# Patient Record
Sex: MALE | Race: WHITE | URBAN - METROPOLITAN AREA
[De-identification: names, ages, dates, MRNs, and addresses within clinical notes are randomized per-mention and may not be internally consistent; named-entity substitution may affect disease eponyms.]

---

## 2021-05-12 ENCOUNTER — OFFICE VISIT (OUTPATIENT)
Dept: URBAN - METROPOLITAN AREA CLINIC 74 | Facility: CLINIC | Age: 46
End: 2021-05-12
Payer: COMMERCIAL

## 2021-05-12 ENCOUNTER — WEB ENCOUNTER (OUTPATIENT)
Dept: URBAN - METROPOLITAN AREA CLINIC 74 | Facility: CLINIC | Age: 46
End: 2021-05-12

## 2021-05-12 DIAGNOSIS — Z12.11 COLON CANCER SCREENING: ICD-10-CM

## 2021-05-12 DIAGNOSIS — R94.5 ABNORMAL LFTS: ICD-10-CM

## 2021-05-12 DIAGNOSIS — K21.9 GASTROESOPHAGEAL REFLUX DISEASE, UNSPECIFIED WHETHER ESOPHAGITIS PRESENT: ICD-10-CM

## 2021-05-12 DIAGNOSIS — R14.0 BLOATING: ICD-10-CM

## 2021-05-12 PROBLEM — 116289008: Status: ACTIVE | Noted: 2021-05-12

## 2021-05-12 PROBLEM — 166603001: Status: ACTIVE | Noted: 2021-05-12

## 2021-05-12 PROCEDURE — 99204 OFFICE O/P NEW MOD 45 MIN: CPT | Performed by: STUDENT IN AN ORGANIZED HEALTH CARE EDUCATION/TRAINING PROGRAM

## 2021-05-12 RX ORDER — SODIUM, POTASSIUM,MAG SULFATES 17.5-3.13G
354ML SOLUTION, RECONSTITUTED, ORAL ORAL
Qty: 354 MILLILITER | Refills: 0 | OUTPATIENT
Start: 2021-05-12 | End: 2021-05-13

## 2021-05-12 RX ORDER — LISINOPRIL 40 MG/1
1 TABLET TABLET ORAL ONCE A DAY
Status: ACTIVE | COMMUNITY

## 2021-05-12 RX ORDER — METOPROLOL SUCCINATE 25 MG/1
1 TABLET TABLET, FILM COATED, EXTENDED RELEASE ORAL ONCE A DAY
Status: ACTIVE | COMMUNITY

## 2021-05-12 RX ORDER — OMEPRAZOLE 20 MG/1
1 CAPSULE 30 MINUTES BEFORE MORNING MEAL CAPSULE, DELAYED RELEASE ORAL ONCE A DAY
Qty: 30 | Refills: 2 | OUTPATIENT
Start: 2021-05-12

## 2021-05-12 RX ORDER — AMLODIPINE BESYLATE 10 MG/1
1 TABLET TABLET ORAL ONCE A DAY
Status: ACTIVE | COMMUNITY

## 2021-05-12 NOTE — HPI-TODAY'S VISIT:
45-year-old male New to me Comes in for evaluation of multiple GI complaints Patient states that he has been having heartburn for many months and it used to be controlled well with PPI but not anymore.  Patient denies any difficulty in swallowing or odynophagia.  No nausea or vomiting.  Does report significant bloating which is bothersome for him.  Does not changes with the type of meal.  Abdomen discomfort which is moderate in intensity and mostly in the upper abdomen with no radiation to back or shoulder.  No specific worsening in postprandial state. No change in bowel movements.  No constipation or diarrhea.  No blood in the stool. No NSAID use. No marijuana use. No significant history of colon cancer in first-degree relatives. No prior colonoscopy or EGD. No anticoagulation.

## 2021-06-10 PROBLEM — 305058001: Status: ACTIVE | Noted: 2021-05-12

## 2021-06-18 ENCOUNTER — TELEPHONE ENCOUNTER (OUTPATIENT)
Dept: URBAN - METROPOLITAN AREA CLINIC 74 | Facility: CLINIC | Age: 46
End: 2021-06-18

## 2021-06-21 ENCOUNTER — OFFICE VISIT (OUTPATIENT)
Dept: URBAN - METROPOLITAN AREA CLINIC 73 | Facility: CLINIC | Age: 46
End: 2021-06-21
Payer: COMMERCIAL

## 2021-06-21 DIAGNOSIS — K80.20 GALLSTONES: ICD-10-CM

## 2021-06-21 DIAGNOSIS — N28.1 RIGHT RENAL CYST: ICD-10-CM

## 2021-06-21 DIAGNOSIS — K76.0 FATTY (CHANGE OF) LIVER, NOT ELSEWHERE CLASSIFIED: ICD-10-CM

## 2021-06-21 PROCEDURE — 76705 ECHO EXAM OF ABDOMEN: CPT | Performed by: STUDENT IN AN ORGANIZED HEALTH CARE EDUCATION/TRAINING PROGRAM

## 2021-06-23 ENCOUNTER — TELEPHONE ENCOUNTER (OUTPATIENT)
Dept: URBAN - METROPOLITAN AREA CLINIC 74 | Facility: CLINIC | Age: 46
End: 2021-06-23

## 2021-06-24 ENCOUNTER — OFFICE VISIT (OUTPATIENT)
Dept: URBAN - METROPOLITAN AREA SURGERY CENTER 30 | Facility: SURGERY CENTER | Age: 46
End: 2021-06-24
Payer: COMMERCIAL

## 2021-06-24 DIAGNOSIS — K22.8 COLUMNAR-LINED ESOPHAGUS: ICD-10-CM

## 2021-06-24 DIAGNOSIS — B96.81 BACTERIAL INFECTION DUE TO H. PYLORI: ICD-10-CM

## 2021-06-24 DIAGNOSIS — Z12.11 COLON CANCER SCREENING: ICD-10-CM

## 2021-06-24 DIAGNOSIS — K29.60 ADENOPAPILLOMATOSIS GASTRICA: ICD-10-CM

## 2021-06-24 DIAGNOSIS — D12.2 ADENOMA OF ASCENDING COLON: ICD-10-CM

## 2021-06-24 DIAGNOSIS — D12.4 ADENOMA OF DESCENDING COLON: ICD-10-CM

## 2021-06-24 PROCEDURE — G8907 PT DOC NO EVENTS ON DISCHARG: HCPCS | Performed by: STUDENT IN AN ORGANIZED HEALTH CARE EDUCATION/TRAINING PROGRAM

## 2021-06-24 PROCEDURE — 43239 EGD BIOPSY SINGLE/MULTIPLE: CPT | Performed by: STUDENT IN AN ORGANIZED HEALTH CARE EDUCATION/TRAINING PROGRAM

## 2021-06-24 PROCEDURE — 45385 COLONOSCOPY W/LESION REMOVAL: CPT | Performed by: STUDENT IN AN ORGANIZED HEALTH CARE EDUCATION/TRAINING PROGRAM

## 2021-07-07 ENCOUNTER — OFFICE VISIT (OUTPATIENT)
Dept: URBAN - METROPOLITAN AREA CLINIC 74 | Facility: CLINIC | Age: 46
End: 2021-07-07
Payer: COMMERCIAL

## 2021-07-07 DIAGNOSIS — D12.6 SERRATED ADENOMA OF COLON: ICD-10-CM

## 2021-07-07 DIAGNOSIS — A04.8 HELICOBACTER PYLORI (H. PYLORI) INFECTION: ICD-10-CM

## 2021-07-07 PROCEDURE — 99213 OFFICE O/P EST LOW 20 MIN: CPT | Performed by: STUDENT IN AN ORGANIZED HEALTH CARE EDUCATION/TRAINING PROGRAM

## 2021-07-07 RX ORDER — AMOXICILLIN 500 MG/1
2 CAPSULES CAPSULE ORAL TWICE A DAY
Qty: 40 CAPSULE | Refills: 0 | OUTPATIENT

## 2021-07-07 RX ORDER — LISINOPRIL 40 MG/1
1 TABLET TABLET ORAL ONCE A DAY
Status: ACTIVE | COMMUNITY

## 2021-07-07 RX ORDER — PANTOPRAZOLE SODIUM 40 MG/1
1 TABLET TABLET, DELAYED RELEASE ORAL BID
Qty: 20 TABLET | Refills: 0 | OUTPATIENT

## 2021-07-07 RX ORDER — AMLODIPINE BESYLATE 10 MG/1
1 TABLET TABLET ORAL ONCE A DAY
Status: ACTIVE | COMMUNITY

## 2021-07-07 RX ORDER — METOPROLOL SUCCINATE 25 MG/1
1 TABLET TABLET, FILM COATED, EXTENDED RELEASE ORAL ONCE A DAY
Status: ACTIVE | COMMUNITY

## 2021-07-07 RX ORDER — OMEPRAZOLE 20 MG/1
1 CAPSULE 30 MINUTES BEFORE MORNING MEAL CAPSULE, DELAYED RELEASE ORAL ONCE A DAY
Qty: 30 | Refills: 2 | Status: ACTIVE | COMMUNITY
Start: 2021-05-12

## 2021-07-07 RX ORDER — METRONIDAZOLE 500 MG/1
1 TABLET TABLET ORAL TWICE A DAY
Qty: 20 TABLET | Refills: 0 | OUTPATIENT

## 2021-07-07 RX ORDER — CLARITHROMYCIN 500 MG/1
1 TABLET TABLET, FILM COATED ORAL BID
Qty: 20 TABLET | Refills: 0 | OUTPATIENT

## 2021-07-07 NOTE — HPI-TODAY'S VISIT:
45-year-old male Well-established with me comes in for follow-up visit EGD was notable for gastritis with biopsies confirming Helicobacter pylori infection.  Small bowel biopsies within normal limit. Colonoscopy was notable for 2 small polyps, both were removed, both were sessile serrated adenoma on final pathology. Patient currently on PPI.  States bloating is better.  Still intermittent bloating reported.  Otherwise no nausea vomiting.  No significant abdomen pain.  Regular bowel movements.  No reflux or dysphagia.

## 2021-08-13 ENCOUNTER — OFFICE VISIT (OUTPATIENT)
Dept: URBAN - METROPOLITAN AREA CLINIC 73 | Facility: CLINIC | Age: 46
End: 2021-08-13
Payer: COMMERCIAL

## 2021-08-13 DIAGNOSIS — D12.5 ADENOMA OF SIGMOID COLON: ICD-10-CM

## 2021-08-13 DIAGNOSIS — K29.60 ADENOPAPILLOMATOSIS GASTRICA: ICD-10-CM

## 2021-08-13 DIAGNOSIS — A04.8 BACTERIAL INFECTION DUE TO H. PYLORI: ICD-10-CM

## 2021-08-13 PROCEDURE — 83014 H PYLORI DRUG ADMIN: CPT | Performed by: INTERNAL MEDICINE

## 2021-08-13 RX ORDER — METOPROLOL SUCCINATE 25 MG/1
1 TABLET TABLET, FILM COATED, EXTENDED RELEASE ORAL ONCE A DAY
Status: ACTIVE | COMMUNITY

## 2021-08-13 RX ORDER — AMLODIPINE BESYLATE 10 MG/1
1 TABLET TABLET ORAL ONCE A DAY
Status: ACTIVE | COMMUNITY

## 2021-08-13 RX ORDER — PANTOPRAZOLE SODIUM 40 MG/1
1 TABLET TABLET, DELAYED RELEASE ORAL BID
Qty: 20 TABLET | Refills: 0 | Status: ACTIVE | COMMUNITY

## 2021-08-13 RX ORDER — LISINOPRIL 40 MG/1
1 TABLET TABLET ORAL ONCE A DAY
Status: ACTIVE | COMMUNITY

## 2021-08-13 RX ORDER — CLARITHROMYCIN 500 MG/1
1 TABLET TABLET, FILM COATED ORAL BID
Qty: 20 TABLET | Refills: 0 | Status: ACTIVE | COMMUNITY

## 2021-08-13 RX ORDER — METRONIDAZOLE 500 MG/1
1 TABLET TABLET ORAL TWICE A DAY
Qty: 20 TABLET | Refills: 0 | Status: ACTIVE | COMMUNITY

## 2021-08-13 RX ORDER — OMEPRAZOLE 20 MG/1
1 CAPSULE 30 MINUTES BEFORE MORNING MEAL CAPSULE, DELAYED RELEASE ORAL ONCE A DAY
Qty: 30 | Refills: 2 | Status: ACTIVE | COMMUNITY
Start: 2021-05-12

## 2021-08-13 RX ORDER — AMOXICILLIN 500 MG/1
2 CAPSULES CAPSULE ORAL TWICE A DAY
Qty: 40 CAPSULE | Refills: 0 | Status: ACTIVE | COMMUNITY

## 2021-08-17 ENCOUNTER — ERX REFILL RESPONSE (OUTPATIENT)
Dept: URBAN - METROPOLITAN AREA CLINIC 74 | Facility: CLINIC | Age: 46
End: 2021-08-17

## 2021-08-17 RX ORDER — PANTOPRAZOLE SODIUM 40 MG/1
1 TABLET TABLET, DELAYED RELEASE ORAL BID
Qty: 20 TABLET | Refills: 0 | OUTPATIENT

## 2021-08-17 RX ORDER — PANTOPRAZOLE SODIUM 40 MG/1
TAKE 1 TABLET BY MOUTH TWICE A DAY FOR 10 DAYS TABLET, DELAYED RELEASE ORAL
Qty: 20 TABLET | Refills: 1 | OUTPATIENT

## 2021-09-22 ENCOUNTER — TELEPHONE ENCOUNTER (OUTPATIENT)
Dept: URBAN - METROPOLITAN AREA CLINIC 40 | Facility: CLINIC | Age: 46
End: 2021-09-22

## 2021-09-22 RX ORDER — OMEPRAZOLE 20 MG/1
1 CAPSULE 30 MINUTES BEFORE MORNING MEAL CAPSULE, DELAYED RELEASE ORAL ONCE A DAY
Qty: 30 | Refills: 2 | OUTPATIENT
Start: 2021-09-27

## 2021-09-22 RX ORDER — METOPROLOL SUCCINATE 25 MG/1
1 TABLET TABLET, FILM COATED, EXTENDED RELEASE ORAL ONCE A DAY
Status: ACTIVE | COMMUNITY

## 2021-09-22 RX ORDER — METRONIDAZOLE 500 MG/1
1 TABLET TABLET ORAL TWICE A DAY
Qty: 20 TABLET | Refills: 0 | Status: ACTIVE | COMMUNITY

## 2021-09-22 RX ORDER — AMLODIPINE BESYLATE 10 MG/1
1 TABLET TABLET ORAL ONCE A DAY
Status: ACTIVE | COMMUNITY

## 2021-09-22 RX ORDER — CLARITHROMYCIN 500 MG/1
1 TABLET TABLET, FILM COATED ORAL BID
Qty: 20 TABLET | Refills: 0 | Status: ACTIVE | COMMUNITY

## 2021-09-22 RX ORDER — AMOXICILLIN 500 MG/1
2 CAPSULES CAPSULE ORAL TWICE A DAY
Qty: 40 CAPSULE | Refills: 0 | Status: ACTIVE | COMMUNITY

## 2021-09-22 RX ORDER — PANTOPRAZOLE SODIUM 40 MG/1
TAKE 1 TABLET BY MOUTH TWICE A DAY FOR 10 DAYS TABLET, DELAYED RELEASE ORAL
Qty: 20 TABLET | Refills: 1 | Status: ACTIVE | COMMUNITY

## 2021-09-22 RX ORDER — LISINOPRIL 40 MG/1
1 TABLET TABLET ORAL ONCE A DAY
Status: ACTIVE | COMMUNITY

## 2021-09-22 RX ORDER — OMEPRAZOLE 20 MG/1
1 CAPSULE 30 MINUTES BEFORE MORNING MEAL CAPSULE, DELAYED RELEASE ORAL ONCE A DAY
Qty: 30 | Refills: 2 | Status: ACTIVE | COMMUNITY
Start: 2021-05-12

## 2021-10-15 ENCOUNTER — OFFICE VISIT (OUTPATIENT)
Dept: URBAN - METROPOLITAN AREA CLINIC 74 | Facility: CLINIC | Age: 46
End: 2021-10-15

## 2021-10-18 ENCOUNTER — TELEPHONE ENCOUNTER (OUTPATIENT)
Dept: URBAN - METROPOLITAN AREA CLINIC 40 | Facility: CLINIC | Age: 46
End: 2021-10-18

## 2021-10-18 ENCOUNTER — OFFICE VISIT (OUTPATIENT)
Dept: URBAN - METROPOLITAN AREA CLINIC 73 | Facility: CLINIC | Age: 46
End: 2021-10-18
Payer: COMMERCIAL

## 2021-10-18 DIAGNOSIS — R14.0 ABDOMINAL BLOATING: ICD-10-CM

## 2021-10-18 PROCEDURE — 83014 H PYLORI DRUG ADMIN: CPT | Performed by: INTERNAL MEDICINE

## 2021-10-18 RX ORDER — OMEPRAZOLE 20 MG/1
1 CAPSULE 30 MINUTES BEFORE MORNING MEAL CAPSULE, DELAYED RELEASE ORAL ONCE A DAY
Qty: 30 | Refills: 2
Start: 2021-09-27

## 2022-01-10 ENCOUNTER — ERX REFILL RESPONSE (OUTPATIENT)
Dept: URBAN - METROPOLITAN AREA CLINIC 40 | Facility: CLINIC | Age: 47
End: 2022-01-10

## 2022-01-10 RX ORDER — OMEPRAZOLE 20 MG/1
1 CAPSULE 30 MINUTES BEFORE MORNING MEAL CAPSULE, DELAYED RELEASE ORAL ONCE A DAY
Qty: 30 | Refills: 2 | OUTPATIENT

## 2022-01-10 RX ORDER — OMEPRAZOLE 20 MG/1
TAKE 1 CAPSULE BY MOUTH EVERY DAY 30 MINUTES BEFORE MORNING MEAL CAPSULE, DELAYED RELEASE ORAL
Qty: 30 CAPSULE | Refills: 3 | OUTPATIENT

## 2022-05-04 ENCOUNTER — ERX REFILL RESPONSE (OUTPATIENT)
Dept: URBAN - METROPOLITAN AREA CLINIC 40 | Facility: CLINIC | Age: 47
End: 2022-05-04

## 2022-05-04 RX ORDER — OMEPRAZOLE 20 MG/1
TAKE 1 CAPSULE BY MOUTH EVERY DAY 30 MINUTES BEFORE MORNING MEAL CAPSULE, DELAYED RELEASE ORAL
Qty: 30 CAPSULE | Refills: 3 | OUTPATIENT

## 2024-04-04 ENCOUNTER — LAB (OUTPATIENT)
Dept: URBAN - METROPOLITAN AREA MEDICAL CENTER 25 | Facility: MEDICAL CENTER | Age: 49
End: 2024-04-04
Payer: COMMERCIAL

## 2024-04-04 DIAGNOSIS — R93.2 ABN FIND-BILIARY TRACT: ICD-10-CM

## 2024-04-04 DIAGNOSIS — Z90.49 ABSENCE OF GALLBLADDER: ICD-10-CM

## 2024-04-04 DIAGNOSIS — R74.01 ABNORMAL/ELEVATED TRANSAMINASE (SGOT, AMINOTRANSFERASE): ICD-10-CM

## 2024-04-04 DIAGNOSIS — K81.0 GANGRENOUS CHOLECYSTITIS: ICD-10-CM

## 2024-04-04 PROCEDURE — G8427 DOCREV CUR MEDS BY ELIG CLIN: HCPCS | Performed by: STUDENT IN AN ORGANIZED HEALTH CARE EDUCATION/TRAINING PROGRAM

## 2024-04-04 PROCEDURE — 99254 IP/OBS CNSLTJ NEW/EST MOD 60: CPT | Performed by: STUDENT IN AN ORGANIZED HEALTH CARE EDUCATION/TRAINING PROGRAM

## 2024-04-04 PROCEDURE — 99222 1ST HOSP IP/OBS MODERATE 55: CPT | Performed by: STUDENT IN AN ORGANIZED HEALTH CARE EDUCATION/TRAINING PROGRAM

## 2024-04-05 ENCOUNTER — LAB (OUTPATIENT)
Dept: URBAN - METROPOLITAN AREA MEDICAL CENTER 25 | Facility: MEDICAL CENTER | Age: 49
End: 2024-04-05
Payer: COMMERCIAL

## 2024-04-05 DIAGNOSIS — K81.0 GANGRENOUS CHOLECYSTITIS: ICD-10-CM

## 2024-04-05 PROCEDURE — 99232 SBSQ HOSP IP/OBS MODERATE 35: CPT | Performed by: STUDENT IN AN ORGANIZED HEALTH CARE EDUCATION/TRAINING PROGRAM

## 2024-04-08 ENCOUNTER — OV NP (OUTPATIENT)
Dept: URBAN - METROPOLITAN AREA CLINIC 19 | Facility: CLINIC | Age: 49
End: 2024-04-08
Payer: COMMERCIAL

## 2024-04-08 ENCOUNTER — LAB (OUTPATIENT)
Dept: URBAN - METROPOLITAN AREA CLINIC 19 | Facility: CLINIC | Age: 49
End: 2024-04-08

## 2024-04-08 VITALS
HEIGHT: 71 IN | HEART RATE: 100 BPM | SYSTOLIC BLOOD PRESSURE: 110 MMHG | OXYGEN SATURATION: 95 % | WEIGHT: 206 LBS | BODY MASS INDEX: 28.84 KG/M2 | TEMPERATURE: 97.3 F | DIASTOLIC BLOOD PRESSURE: 75 MMHG

## 2024-04-08 DIAGNOSIS — K21.9 GERD: ICD-10-CM

## 2024-04-08 DIAGNOSIS — Z90.49 STATUS POST CHOLECYSTECTOMY: ICD-10-CM

## 2024-04-08 DIAGNOSIS — K44.9 HIATAL HERNIA: ICD-10-CM

## 2024-04-08 DIAGNOSIS — F40.240 CLAUSTROPHOBIA: ICD-10-CM

## 2024-04-08 PROCEDURE — 99203 OFFICE O/P NEW LOW 30 MIN: CPT | Performed by: INTERNAL MEDICINE

## 2024-04-08 PROCEDURE — 99213 OFFICE O/P EST LOW 20 MIN: CPT | Performed by: INTERNAL MEDICINE

## 2024-04-08 RX ORDER — CLARITHROMYCIN 500 MG/1
1 TABLET TABLET, FILM COATED ORAL BID
Qty: 20 TABLET | Refills: 0 | Status: ACTIVE | COMMUNITY

## 2024-04-08 RX ORDER — METOPROLOL SUCCINATE 25 MG/1
1 TABLET TABLET, FILM COATED, EXTENDED RELEASE ORAL ONCE A DAY
Status: ACTIVE | COMMUNITY

## 2024-04-08 RX ORDER — METRONIDAZOLE 500 MG/1
1 TABLET TABLET ORAL TWICE A DAY
Qty: 20 TABLET | Refills: 0 | Status: ACTIVE | COMMUNITY

## 2024-04-08 RX ORDER — PANTOPRAZOLE SODIUM 40 MG/1
TAKE 1 TABLET BY MOUTH TWICE A DAY FOR 10 DAYS TABLET, DELAYED RELEASE ORAL
Qty: 20 TABLET | Refills: 1 | Status: ACTIVE | COMMUNITY

## 2024-04-08 RX ORDER — AMLODIPINE BESYLATE 10 MG/1
1 TABLET TABLET ORAL ONCE A DAY
Status: ACTIVE | COMMUNITY

## 2024-04-08 RX ORDER — LISINOPRIL 40 MG/1
1 TABLET TABLET ORAL ONCE A DAY
Status: ACTIVE | COMMUNITY

## 2024-04-08 RX ORDER — AMOXICILLIN 500 MG/1
2 CAPSULES CAPSULE ORAL TWICE A DAY
Qty: 40 CAPSULE | Refills: 0 | Status: ACTIVE | COMMUNITY

## 2024-04-08 RX ORDER — OMEPRAZOLE 20 MG/1
TAKE 1 CAPSULE BY MOUTH EVERY DAY 30 MINUTES BEFORE MORNING MEAL CAPSULE, DELAYED RELEASE ORAL
Qty: 30 CAPSULE | Refills: 3 | Status: ACTIVE | COMMUNITY

## 2024-04-08 RX ORDER — DIAZEPAM 5 MG/1
1 TABLET 30 MINUTES PRIOR TO MRCP TABLET ORAL ONCE A DAY
Qty: 1 | Refills: 0 | OUTPATIENT
Start: 2024-04-08

## 2024-04-08 NOTE — PHYSICAL EXAM GASTROINTESTINAL
Abdomen,  soft, mild tenderness to ALEIDA drain site (removed last friday) dressing with scant serous drainage, lap sites x 4 CDI, nondistended,  normal bowel sounds

## 2024-04-08 NOTE — HPI-TODAY'S VISIT:
Mr. Estrella is a 48-year-old male with PMH of GERD, HTN and H. pylori infection, who was last seen by Dr. Carr on 7/7/20/2021 and now being referred by Dr. Emerald Stock  [facility] records reviewed: 4/2/2024 seen with complaints of RUQ and nausea.  He was found to have acute cholecystitis and underwent a lap cholecystectomy, which revealed a gangrenous gallbladder. -GI initially consulted due to concern for cholangitis. 4/2/2024 US RUQ: Cholelithiasis with gallbladder sludge and gallbladder wall thickening.  Sonographic Persaud sign is negative. MRCP on 4/3/2024 revealed cholelithiasis and gallbladder sludge with mild gallbladder wall thickening and mild pericholecystic fluid.  Mild dilatation of the common bile duct with questionable filling defect within the distal/downstream periampullary common bile duct concerning for subtle choledocholithiasis.  Today he reports tenderness to ALEIDA drain site (removed last friday) Reports formed stools over the past 2 days. Morehouse #4. Denies nausea, vomiting Tolerating diet well Taking antibiotics as prescribed. No side effects  6/24/2021 EGD by Dr. Carr: Medium size hiatal hernia, gastritis.  Pathology no evidence of celiac disease, chronic active gastritis, positive H. pylori identified. 6/24/2021 colonoscopy: Two 6 to 8 mm polyps in the descending and ascending colon, diverticulosis in the sigmoid, descending and transverse colon.  Repeat in 5 years (2026) pathology: Ascending and descending: Sessile serrated adenoma

## 2024-04-09 LAB
A/G RATIO: 1.3
ALBUMIN: 3.7
ALKALINE PHOSPHATASE: 96
ALT (SGPT): 59
AST (SGOT): 38
BILIRUBIN, TOTAL: 0.5
BUN/CREATININE RATIO: 12
BUN: 16
CALCIUM: 9.5
CARBON DIOXIDE, TOTAL: 23
CHLORIDE: 103
CREATININE: 1.3
EGFR: 67
GLOBULIN, TOTAL: 2.9
GLUCOSE: 104
POTASSIUM: 4.3
PROTEIN, TOTAL: 6.6
SODIUM: 141

## 2024-05-03 ENCOUNTER — OFFICE VISIT (OUTPATIENT)
Dept: URBAN - METROPOLITAN AREA CLINIC 19 | Facility: CLINIC | Age: 49
End: 2024-05-03